# Patient Record
Sex: MALE | Race: WHITE | NOT HISPANIC OR LATINO | Employment: UNEMPLOYED | URBAN - METROPOLITAN AREA
[De-identification: names, ages, dates, MRNs, and addresses within clinical notes are randomized per-mention and may not be internally consistent; named-entity substitution may affect disease eponyms.]

---

## 2019-06-18 ENCOUNTER — HISTORICAL (OUTPATIENT)
Dept: LAB | Facility: HOSPITAL | Age: 59
End: 2019-06-18

## 2019-06-18 LAB
ABS NEUT (OLG): 4.21
BASOPHILS # BLD AUTO: 0.02 X10(3)/MCL
BASOPHILS NFR BLD AUTO: 0.3 %
BUN SERPL-MCNC: 14 MG/DL (ref 7–18)
CALCIUM SERPL-MCNC: 9.1 MG/DL (ref 8.5–10.1)
CHLORIDE SERPL-SCNC: 105 MMOL/L (ref 98–107)
CHOLEST SERPL-MCNC: 131 MG/DL (ref 50–200)
CHOLEST/HDLC SERPL: 3 {RATIO} (ref 0–5)
CO2 SERPL-SCNC: 31.2 MMOL/L (ref 21–32)
CREAT SERPL-MCNC: 1.15 MG/DL (ref 0.7–1.3)
CREAT/UREA NIT SERPL: 12
EOSINOPHIL # BLD AUTO: 0.09 X10(3)/MCL
EOSINOPHIL NFR BLD AUTO: 1.5 %
ERYTHROCYTE [DISTWIDTH] IN BLOOD BY AUTOMATED COUNT: 14 %
GLUCOSE SERPL-MCNC: 109 MG/DL (ref 74–106)
HCT VFR BLD AUTO: 47.2 % (ref 39–49)
HDLC SERPL-MCNC: 43 MG/DL (ref 35–60)
HGB BLD-MCNC: 15.2 GM/DL (ref 12.6–16.6)
IMM GRANULOCYTES # BLD AUTO: 0 10*3/UL (ref 0–0.1)
IMM GRANULOCYTES NFR BLD AUTO: 0 % (ref 0–1)
LDLC SERPL CALC-MCNC: 78 MG/DL (ref 50–140)
LYMPHOCYTES # BLD AUTO: 1.35 X10(3)/MCL
LYMPHOCYTES NFR BLD AUTO: 22 %
MCH RBC QN AUTO: 31.5 PG (ref 27–33)
MCHC RBC AUTO-ENTMCNC: 32.2 GM/DL (ref 32–35)
MCV RBC AUTO: 97.7 FL (ref 84–97)
MONOCYTES # BLD AUTO: 0.47 X10(3)/MCL
MONOCYTES NFR BLD AUTO: 7.7 %
NEUTROPHILS # BLD AUTO: 4.21 X10(3)/MCL
NEUTROPHILS NFR BLD AUTO: 68.5 %
PLATELET # BLD AUTO: 200 X10(3)/MCL (ref 151–368)
PMV BLD AUTO: 10 FL
POTASSIUM SERPL-SCNC: 4.4 MMOL/L (ref 3.5–5.1)
RBC # BLD AUTO: 4.83 X10(6)/MCL (ref 4.3–5.6)
SODIUM SERPL-SCNC: 141 MMOL/L (ref 136–145)
TRIGL SERPL-MCNC: 52 MG/DL (ref 30–150)
VLDLC SERPL CALC-MCNC: 10 MG/DL
WBC # SPEC AUTO: 6.14 X10(3)/MCL (ref 3.4–9.2)

## 2021-02-10 ENCOUNTER — HISTORICAL (OUTPATIENT)
Dept: ADMINISTRATIVE | Facility: HOSPITAL | Age: 61
End: 2021-02-10

## 2021-02-11 ENCOUNTER — HISTORICAL (OUTPATIENT)
Dept: ADMINISTRATIVE | Facility: HOSPITAL | Age: 61
End: 2021-02-11

## 2021-02-13 LAB — FINAL CULTURE: NO GROWTH

## 2021-04-19 ENCOUNTER — HISTORICAL (OUTPATIENT)
Dept: RADIOLOGY | Facility: HOSPITAL | Age: 61
End: 2021-04-19

## 2021-11-19 ENCOUNTER — HISTORICAL (OUTPATIENT)
Dept: ADMINISTRATIVE | Facility: HOSPITAL | Age: 61
End: 2021-11-19

## 2021-12-02 ENCOUNTER — HISTORICAL (OUTPATIENT)
Dept: ADMINISTRATIVE | Facility: HOSPITAL | Age: 61
End: 2021-12-02

## 2021-12-02 LAB — SARS-COV-2 AG RESP QL IA.RAPID: NEGATIVE

## 2021-12-03 ENCOUNTER — HISTORICAL (OUTPATIENT)
Dept: SURGERY | Facility: HOSPITAL | Age: 61
End: 2021-12-03

## 2022-04-10 ENCOUNTER — HISTORICAL (OUTPATIENT)
Dept: ADMINISTRATIVE | Facility: HOSPITAL | Age: 62
End: 2022-04-10
Payer: MEDICAID

## 2022-04-25 VITALS
BODY MASS INDEX: 35.18 KG/M2 | DIASTOLIC BLOOD PRESSURE: 86 MMHG | SYSTOLIC BLOOD PRESSURE: 162 MMHG | OXYGEN SATURATION: 100 % | HEIGHT: 71 IN | WEIGHT: 251.31 LBS

## 2022-05-03 NOTE — HISTORICAL [facility] CERNER
This is a historical note converted from [employer]. Formatting and pictures may have been removed.  Please reference [employer] for original formatting and attached multimedia. Indication for Surgery  60yM with nasal obstruction and nasal congestion secondary to inferior turbinate hypertrophy and angel bullosa. Very slight leftward deviation of the caudal septum however do not feel that this is the source of patients issue. He agrees and would like to hold off on septoplasty at this time. Risks benefits and alternatives of angel bullosa resection and turbinate reduction discussed. Consent obtained.  Preoperative Diagnosis  Nasal congestion  Turbinate hypertrophy  Right angel bullosa  Postoperative Diagnosis  Same as preop  Operation  Turbinate reduction, bilateral  Right angel bullosa resection  Surgeon(s)  MD Dean Lucero MD  Anesthesia  General  Estimated Blood Loss  15cc  Urine Output  None  Findings  Enlarged turbinates bilaterally. Good nasal airway post operatively.  Specimen(s)  None  Complications  None  Technique  After informed consent was obtained, the patient was brought to the operating room and placed supine on the operating table. General anesthesia was induced and patient intubated without difficulty. The eyes were protected with clear tape. Lidocaine 1% with epinephrine?1:100,000 was injected into the inferior turbinates bilaterally as well as the right middle turbinate/lateral nasal wall. Cotton pledgets soaked in afrin were then inserted into both nasal cavities. A timeout was performed. The patient was then prepped and draped in the standard sterile fashion.  ?   Cotton pledgets were removed from both nares. Nasal endoscopy with the zero degree scope was performed bilaterally with no concerning lesions. A sickle?knife was used to make an incision along the anterior face of the right middle turbinate. Using endoscopic?scissors, this incision was carried posteriorly along the  inferior aspect of the turbinate and superiorly through the posterior most aspect. The lateral portion of the middle turbinate was removed with a straight thru cut. The medial aspect of the middle turbinate was then gently lateralized with the freer.  ?   Attention was then turned to the turbinoplasty, starting on the right. A straight clamp was placed along the inferior portion of the turbinate. After several minutes, the hemostat was removed. A scissor was used to remove the inferior portion of the turbinate. Hemostasis was achieved with silver nitrate on the under surface of the turbinate. The same procedure was then completed on the left.  ?   The nasal cavity was irrigated and suctioned. The patient was then returned to the care of anesthesia for wake up.  ?

## 2022-07-07 RX ORDER — FLUTICASONE PROPIONATE 50 MCG
1 SPRAY, SUSPENSION (ML) NASAL DAILY
COMMUNITY
Start: 2021-08-20 | End: 2022-07-07 | Stop reason: SDUPTHER

## 2022-07-12 RX ORDER — FLUTICASONE PROPIONATE 50 UG/1
1 SPRAY, METERED NASAL DAILY
Qty: 9.9 ML | Refills: 4 | Status: SHIPPED | OUTPATIENT
Start: 2022-07-12

## 2022-07-13 ENCOUNTER — OFFICE VISIT (OUTPATIENT)
Dept: OTOLARYNGOLOGY | Facility: CLINIC | Age: 62
End: 2022-07-13
Payer: MEDICAID

## 2022-07-13 VITALS
RESPIRATION RATE: 16 BRPM | HEIGHT: 71 IN | WEIGHT: 248 LBS | BODY MASS INDEX: 34.72 KG/M2 | SYSTOLIC BLOOD PRESSURE: 173 MMHG | HEART RATE: 86 BPM | TEMPERATURE: 98 F | DIASTOLIC BLOOD PRESSURE: 84 MMHG

## 2022-07-13 DIAGNOSIS — J34.89 NASAL OBSTRUCTION: Primary | ICD-10-CM

## 2022-07-13 PROCEDURE — 99213 OFFICE O/P EST LOW 20 MIN: CPT | Mod: PBBFAC | Performed by: OTOLARYNGOLOGY

## 2022-07-13 NOTE — PROGRESS NOTES
[facility]  Otolaryngology Clinic Note    Hugh Jones Jr.  Encounter Date: 7/13/2022  YOB: 1960  Physician: Marjorie Ross    Chief Complaint: sinus pressure      HPI:   3/19/21: 60yoM referred for AR. He c/o chronic right nasal congestion and obstruction for a few years and is progressively getting worse and seems to be causing headaches. Hx of b/l clear rhinorrhea, PND, and sneezing which is now better controlled on singulair. Intermittent facial pain/pressure around nasal dorsum and periorbitally. Denies hyposmia. Used flonase daily for 2 years without benefit, recently discontinued. Reports running into a tree and messing up his nose a little bit as a small child. No prior surgery. Infrequent sinus infections which usually clear with OTC sinus meds. This is a Telehealth visit that was performed with the originating site at patient's home and the distant site, [facility]. I have reviewed the patient name and date of birth.  I have verified that this patient is in the state [facility]. Verbal consent to participate in interactive audio & video (unavailable) visit was obtained. This particular visit occurred during 2020 COVID19 outbreak. I discussed with the patient regarding the nature of our telehealth visits, that:   - Our sessions are not being recorded and that personal health information is protected   - I would evaluate the patient and recommend diagnostics and treatments based on my assessment  - The team will provide follow up care in person if/when the patient needs it.   Time spent on patient counseling, care coordination and treatment plan development: 13 min    5/6/21: Here today for follow up after CT scan. Reports constant congestion and nasal obstruction bilaterally but right worse than left. Feels that he is unable to get his air in. Endorses some clear rhinorrhea but only when eating spicy foods. No post nasal drip. Endorses facial pain and pressure over the  nasal dorsum and periorbitally especially on right. Denies change in smell or taste. Using Flonase daily for the last 2 years which he felt initially worked but is not helping anymore. Had stopped for a short while but restarted after last visit. Was started on azelastine at last office visit which has not helped.    8/4/21: here today to discuss surgery, cleared by cards    11/11/21: Here today to discuss surgery. He reports ongoing issues with congestion bilaterally, right worse than left. Continues to use flonase once daily but recently having some blood tinged mucous from right nare with use. Still interested in surgery.    12/9/21: Here for post op visit after turb reduction and angel bullosa resection 12/2. Has not used any NSI or NSS. Discomfort has resolved. However still doesn't feel like he can breath well through right nasal passage. Said he used breathe rite strips last night that helped. [1]    December 23, 2021:  61-year-old male presents today for follow-up of his bilateral inferior turbinectomy and right angel bullosa resection. Patient is doing nasal irrigations 2-3 times daily. He is still having difficulty with right-sided nasal obstruction. He is not having much problem with the left side of the nose. He is getting crust and old blood out periodically as well as periodic discolored drainage. He has no fever. No headaches.    1/18/21: Patient here today for third postop visit status post turbinectomy and angel resection. Patient has been doing Flonase as well as nasal saline twice a day. He feels much better than he did at his last visit. At his second visit there was some synechiae which was bluntly dissected as well as some mucopurulent secretions which were suctioned. He has not been getting much crusting or discharge from his nose since then. Reports he is doing well today with no nasal obstruction complaints and feels great.    7/13/22: Here for follow up. Hasn't followed up in a while;  reports he had an elective knee surgery in February and coded for several minutes with multiple days in the ICU after. Since then, he is very frightened to have general anesthesia again. Reports he did very well with his prior nasal surgery but for the past 2 weeks has recurrence of much the same symptoms. Started back on Flonase 3-4 days ago, but he thinks it might be elevating his blood pressure, though his new albuterol may be contributing to this. Mainly complains of right nasal congestion, mild obstruction.     ROS:   General: Negative except per HPI  Skin: Denies rash, ulcer, or lesion.  Eyes: Denies vision changes or diplopia.  Ears: Negative except per HPI  Nose: Negative except per HPI  Throat/mouth: Negative except per HPI  Cardiovascular: Negative except per HPI  Respiratory: Negative except per HPI  Neck: Negative except per HPI  Endocrine: Negative except per HPI  Neurologic: Negative except per HPI    Other 10-point review of systems negative except per HPI      Review of patient's allergies indicates:  No Known Allergies    History reviewed. No pertinent past medical history.    Past Surgical History:   Procedure Laterality Date    Left total knee replacement Left 02/08/2022    [facility]       Social History     Socioeconomic History    Marital status:    Tobacco Use    Smoking status: Never Smoker    Smokeless tobacco: Never Used       History reviewed. No pertinent family history.    Outpatient Encounter Medications as of 7/13/2022   Medication Sig Dispense Refill    FLONASE ALLERGY RELIEF 50 mcg/actuation nasal spray 1 spray (50 mcg total) by Each Nostril route once daily at 6am. 9.9 mL 4     No facility-administered encounter medications on file as of 7/13/2022.       Physical Exam:  Vitals:    07/13/22 1313   BP: (!) 173/84   BP Location: Right arm   Patient Position: Sitting   BP Method: Medium (Automatic)   Pulse: 86   Resp: 16   Temp: 97.5 °F (36.4 °C)   TempSrc: Oral  "  Weight: 112.5 kg (248 lb)   Height: 5' 11" (1.803 m)       Constitutional  General: Well-developed well-nourished male in no acute distress. Voice is normal. No hoarseness.  Head and face: Normocephalic. No facial lesions. No temporomandibular joint tenderness or click.  Nose: Slight anterior deviation of septum but overall straight. No ITH. No crusting.   Eyes: Extraocular muscles are intact bilaterally. No exophthalmos or enophthalmos.  Neurologic: Alert and oriented x3. Cranial nerves II through XII are grossly normal.    Pertinent Data:  ? LABS:  ? AUDIO:  ? CT Scan:    Imaging:   I personally reviewed the following images:    Summary of Outside Records:      Assessment/Plan:  Hugh Jones Jr. is a 61 y.o. male with nasal obstruction status post bilateral inferior turbinectomy and right middle turbinate angel bullosa resection on December 3, 2021. Reports recurrent symptoms today x 2 weeks but has not been on flonase. Eager to avoid surgery due to anesthetic complications during knee surgery in Feb. Also has some improvement with Modified digna    Plan:  Restart fluticasone nasal spray 2 puffs each nostril BID. Monitor blood pressures.    Could consider septoplasty in future but unsure this would provide much relief of symptoms.     RTC 4-6 weeks.     Marjorie Ross MD  [facility] Department of Otolaryngology  HO-IV            "

## 2022-07-21 NOTE — PROGRESS NOTES
I have reviewed the notes, assessments, and/or procedures performed this visit, and I concur with the documentation.    Dean Randhawa M.D.

## 2022-08-30 ENCOUNTER — OFFICE VISIT (OUTPATIENT)
Dept: OTOLARYNGOLOGY | Facility: CLINIC | Age: 62
End: 2022-08-30
Payer: MEDICAID

## 2022-08-30 VITALS
DIASTOLIC BLOOD PRESSURE: 92 MMHG | WEIGHT: 246.81 LBS | BODY MASS INDEX: 34.42 KG/M2 | HEART RATE: 102 BPM | SYSTOLIC BLOOD PRESSURE: 175 MMHG

## 2022-08-30 DIAGNOSIS — J32.9 CHRONIC SINUSITIS, UNSPECIFIED LOCATION: ICD-10-CM

## 2022-08-30 DIAGNOSIS — K21.9 LARYNGOPHARYNGEAL REFLUX (LPR): Primary | ICD-10-CM

## 2022-08-30 DIAGNOSIS — J30.89 ALLERGIC RHINITIS DUE TO OTHER ALLERGIC TRIGGER, UNSPECIFIED SEASONALITY: ICD-10-CM

## 2022-08-30 DIAGNOSIS — J34.89 NASAL OBSTRUCTION: ICD-10-CM

## 2022-08-30 PROCEDURE — 99213 OFFICE O/P EST LOW 20 MIN: CPT | Mod: PBBFAC | Performed by: OTOLARYNGOLOGY

## 2022-08-30 RX ORDER — CETIRIZINE HYDROCHLORIDE 10 MG/1
10 TABLET ORAL DAILY
Qty: 30 TABLET | Refills: 3 | Status: SHIPPED | OUTPATIENT
Start: 2022-08-30 | End: 2023-08-30

## 2022-08-30 RX ORDER — AZELASTINE 1 MG/ML
1 SPRAY, METERED NASAL 2 TIMES DAILY
Qty: 30 ML | Refills: 1 | Status: SHIPPED | OUTPATIENT
Start: 2022-08-30 | End: 2023-08-30

## 2022-08-30 RX ORDER — PANTOPRAZOLE SODIUM 40 MG/1
40 TABLET, DELAYED RELEASE ORAL DAILY
Qty: 60 TABLET | Refills: 2 | Status: SHIPPED | OUTPATIENT
Start: 2022-08-30 | End: 2023-08-30

## 2022-08-30 NOTE — PROGRESS NOTES
[facility]  Otolaryngology Clinic Note    Hugh Jones Jr.  Encounter Date: 8/30/2022  YOB: 1960    Chief Complaint: sinus pressure    HPI:   3/19/21: 60yoM referred for AR. He c/o chronic right nasal congestion and obstruction for a few years and is progressively getting worse and seems to be causing headaches. Hx of b/l clear rhinorrhea, PND, and sneezing which is now better controlled on singulair. Intermittent facial pain/pressure around nasal dorsum and periorbitally. Denies hyposmia. Used flonase daily for 2 years without benefit, recently discontinued. Reports running into a tree and messing up his nose a little bit as a small child. No prior surgery. Infrequent sinus infections which usually clear with OTC sinus meds. This is a Telehealth visit that was performed with the originating site at patient's home and the distant site, [facility]. I have reviewed the patient name and date of birth.  I have verified that this patient is in the state [facility]. Verbal consent to participate in interactive audio & video (unavailable) visit was obtained. This particular visit occurred during 2020 COVID19 outbreak. I discussed with the patient regarding the nature of our telehealth visits, that:   - Our sessions are not being recorded and that personal health information is protected   - I would evaluate the patient and recommend diagnostics and treatments based on my assessment  - The team will provide follow up care in person if/when the patient needs it.   Time spent on patient counseling, care coordination and treatment plan development: 13 min    5/6/21: Here today for follow up after CT scan. Reports constant congestion and nasal obstruction bilaterally but right worse than left. Feels that he is unable to get his air in. Endorses some clear rhinorrhea but only when eating spicy foods. No post nasal drip. Endorses facial pain and pressure over the nasal dorsum and  periorbitally especially on right. Denies change in smell or taste. Using Flonase daily for the last 2 years which he felt initially worked but is not helping anymore. Had stopped for a short while but restarted after last visit. Was started on azelastine at last office visit which has not helped.    8/4/21: here today to discuss surgery, cleared by cards    11/11/21: Here today to discuss surgery. He reports ongoing issues with congestion bilaterally, right worse than left. Continues to use flonase once daily but recently having some blood tinged mucous from right nare with use. Still interested in surgery.    12/9/21: Here for post op visit after turb reduction and angel bullosa resection 12/2. Has not used any NSI or NSS. Discomfort has resolved. However still doesn't feel like he can breath well through right nasal passage. Said he used breathe rite strips last night that helped. [1]    December 23, 2021:  61-year-old male presents today for follow-up of his bilateral inferior turbinectomy and right angel bullosa resection. Patient is doing nasal irrigations 2-3 times daily. He is still having difficulty with right-sided nasal obstruction. He is not having much problem with the left side of the nose. He is getting crust and old blood out periodically as well as periodic discolored drainage. He has no fever. No headaches.    1/18/21: Patient here today for third postop visit status post turbinectomy and angel resection. Patient has been doing Flonase as well as nasal saline twice a day. He feels much better than he did at his last visit. At his second visit there was some synechiae which was bluntly dissected as well as some mucopurulent secretions which were suctioned. He has not been getting much crusting or discharge from his nose since then. Reports he is doing well today with no nasal obstruction complaints and feels great.    7/13/22: Here for follow up. Hasn't followed up in a while; reports he had an  elective knee surgery in February and coded for several minutes with multiple days in the ICU after. Since then, he is very frightened to have general anesthesia again. Reports he did very well with his prior nasal surgery but for the past 2 weeks has recurrence of much the same symptoms. Started back on Flonase 3-4 days ago, but he thinks it might be elevating his blood pressure, though his new albuterol may be contributing to this. Mainly complains of right nasal congestion, mild obstruction.     8/30/22: Patient presents today for follow up. He continues to report difficulty breathing on the right side and notes that he has to switch breathing between his nose and mouth. He states he has been using Flonase twice daily without resolution of symptoms. He complains of itchy eyes, post-nasal drip and sore throat for the past 3 weeks associated with cough and thick, yellow mucus. He states that he has tried Tylenol and OTC cold and flu medications without improvement. He complains of left otalgia and fullness. He denies recent illness, fever, chills, hemoptysis, nausea, or vomiting. He complains of globus and denies heartburn.     ROS:   General: Negative except per HPI  Skin: Denies rash, ulcer, or lesion.  Eyes: Denies vision changes or diplopia.  Ears: Negative except per HPI  Nose: Negative except per HPI  Throat/mouth: Negative except per HPI  Cardiovascular: Negative except per HPI  Respiratory: Negative except per HPI  Neck: Negative except per HPI  Endocrine: Negative except per HPI  Neurologic: Negative except per HPI    Other 10-point review of systems negative except per HPI    Review of patient's allergies indicates:  No Known Allergies    History reviewed. No pertinent past medical history.    Past Surgical History:   Procedure Laterality Date    Left total knee replacement Left 02/08/2022    [facility]    NASAL SINUS SURGERY Bilateral        Social History     Socioeconomic History    Marital  "status:    Tobacco Use    Smoking status: Never    Smokeless tobacco: Never   Substance and Sexual Activity    Drug use: Not Currently    Sexual activity: Not Currently     History reviewed. No pertinent family history.    Outpatient Encounter Medications as of 8/30/2022   Medication Sig Dispense Refill    FLONASE ALLERGY RELIEF 50 mcg/actuation nasal spray 1 spray (50 mcg total) by Each Nostril route once daily at 6am. 9.9 mL 4     No facility-administered encounter medications on file as of 8/30/2022.     Physical Exam:  Vitals:    08/30/22 1312   BP: (!) 175/92   BP Location: Left arm   Pulse: 102   Weight: 111.9 kg (246 lb 12.8 oz)     Constitutional  General: Well-developed well-nourished male in no acute distress. Voice is normal. No hoarseness.  Head and face: Normocephalic. No facial lesions. No temporomandibular joint tenderness or click.  Nose: Slight anterior deviation of septum but overall straight. No ITH. No crusting. "Minimal" improvement with modified digna.  Eyes: Extraocular muscles are intact bilaterally. No exophthalmos or enophthalmos.  Neurologic: Alert and oriented x3. Cranial nerves II through XII are grossly normal.    Assessment/Plan:  Hugh Jones Jr. is a 62 y.o. male with nasal obstruction status post bilateral inferior turbinectomy and right middle turbinate angel bullosa resection on December 3, 2021. Reports he had around 3 months of relief but that he feels like he is back where he started preoperative. This has not improved with flonase. Now reporting post-nasal drop and sore throat associated with cough and thick mucus, as well as throat clearing and globus. Patient would like to continue conservative management and avoid surgery unless necessary.     - Continue to use Flonase once daily and add Astelin to improve respiration and post-nasal drip.  - Will refer patient for allergy testing as he said he has been sneezing a lot and itchy watery eyes. Patient prescribed " "cetrizine for allergy symptoms.  - Protonix prescribed for globus for possible LPR component of complaints.  - CT sinus scan prior to appointment given recurrent sinus complaints. Does not like NSI as "it doesn't help"  - RTC in 6 weeks.    Radha Orellana, MS3  [facility] Department of Otolaryngology    Agree with above with addition:   Will attempt to treat patient's complaints from above with sinus regimen and astelin/allergy meds/RAST and below with LPR control with protonix.   Discussed that there may be a role for OSRP in the future but do not feel convinced that his complaints are truly anatomical as he (1) had complete resolution for 3 months after last surgery, (2) minimal improvement with breathe right strips/modified digna. His septum is pretty straight on last CT though nasal vault is perhaps narrow.     Marjorie Ross MD  [facility] Otolaryngology HO-IV              "

## 2022-09-07 ENCOUNTER — HOSPITAL ENCOUNTER (OUTPATIENT)
Dept: RADIOLOGY | Facility: HOSPITAL | Age: 62
Discharge: HOME | End: 2022-09-07
Attending: STUDENT IN AN ORGANIZED HEALTH CARE EDUCATION/TRAINING PROGRAM
Payer: MEDICAID

## 2022-09-07 DIAGNOSIS — J32.9 CHRONIC SINUSITIS, UNSPECIFIED LOCATION: ICD-10-CM

## 2022-09-07 PROCEDURE — 70486 CT MAXILLOFACIAL W/O DYE: CPT | Mod: TC

## 2022-10-11 ENCOUNTER — OFFICE VISIT (OUTPATIENT)
Dept: OTOLARYNGOLOGY | Facility: CLINIC | Age: 62
End: 2022-10-11
Payer: MEDICAID

## 2022-10-11 VITALS
SYSTOLIC BLOOD PRESSURE: 131 MMHG | TEMPERATURE: 98 F | WEIGHT: 243 LBS | BODY MASS INDEX: 33.89 KG/M2 | HEART RATE: 81 BPM | DIASTOLIC BLOOD PRESSURE: 79 MMHG

## 2022-10-11 DIAGNOSIS — R09.81 CHRONIC NASAL CONGESTION: ICD-10-CM

## 2022-10-11 PROCEDURE — 99213 OFFICE O/P EST LOW 20 MIN: CPT | Mod: PBBFAC | Performed by: STUDENT IN AN ORGANIZED HEALTH CARE EDUCATION/TRAINING PROGRAM

## 2022-10-11 RX ORDER — AMLODIPINE BESYLATE 5 MG/1
5 TABLET ORAL DAILY
COMMUNITY
Start: 2022-10-04

## 2022-10-11 RX ORDER — OLMESARTAN MEDOXOMIL 20 MG/1
TABLET ORAL
COMMUNITY
Start: 2022-06-03

## 2022-10-11 NOTE — PROGRESS NOTES
[facility]  Otolaryngology Clinic Note    Hugh Jones Jr.  Encounter Date: 10/11/2022  YOB: 1960    Chief Complaint: sinus pressure    HPI:   3/19/21: 60yoM referred for AR. He c/o chronic right nasal congestion and obstruction for a few years and is progressively getting worse and seems to be causing headaches. Hx of b/l clear rhinorrhea, PND, and sneezing which is now better controlled on singulair. Intermittent facial pain/pressure around nasal dorsum and periorbitally. Denies hyposmia. Used flonase daily for 2 years without benefit, recently discontinued. Reports running into a tree and messing up his nose a little bit as a small child. No prior surgery. Infrequent sinus infections which usually clear with OTC sinus meds. This is a Telehealth visit that was performed with the originating site at patient's home and the distant site, [facility]. I have reviewed the patient name and date of birth.  I have verified that this patient is in the state [facility]. Verbal consent to participate in interactive audio & video (unavailable) visit was obtained. This particular visit occurred during 2020 COVID19 outbreak. I discussed with the patient regarding the nature of our telehealth visits, that:   - Our sessions are not being recorded and that personal health information is protected   - I would evaluate the patient and recommend diagnostics and treatments based on my assessment  - The team will provide follow up care in person if/when the patient needs it.   Time spent on patient counseling, care coordination and treatment plan development: 13 min    5/6/21: Here today for follow up after CT scan. Reports constant congestion and nasal obstruction bilaterally but right worse than left. Feels that he is unable to get his air in. Endorses some clear rhinorrhea but only when eating spicy foods. No post nasal drip. Endorses facial pain and pressure over the nasal dorsum and  periorbitally especially on right. Denies change in smell or taste. Using Flonase daily for the last 2 years which he felt initially worked but is not helping anymore. Had stopped for a short while but restarted after last visit. Was started on azelastine at last office visit which has not helped.    8/4/21: here today to discuss surgery, cleared by cards    11/11/21: Here today to discuss surgery. He reports ongoing issues with congestion bilaterally, right worse than left. Continues to use flonase once daily but recently having some blood tinged mucous from right nare with use. Still interested in surgery.    12/9/21: Here for post op visit after turb reduction and angel bullosa resection 12/2. Has not used any NSI or NSS. Discomfort has resolved. However still doesn't feel like he can breath well through right nasal passage. Said he used breathe rite strips last night that helped. [1]    December 23, 2021:  61-year-old male presents today for follow-up of his bilateral inferior turbinectomy and right angel bullosa resection. Patient is doing nasal irrigations 2-3 times daily. He is still having difficulty with right-sided nasal obstruction. He is not having much problem with the left side of the nose. He is getting crust and old blood out periodically as well as periodic discolored drainage. He has no fever. No headaches.    1/18/21: Patient here today for third postop visit status post turbinectomy and angel resection. Patient has been doing Flonase as well as nasal saline twice a day. He feels much better than he did at his last visit. At his second visit there was some synechiae which was bluntly dissected as well as some mucopurulent secretions which were suctioned. He has not been getting much crusting or discharge from his nose since then. Reports he is doing well today with no nasal obstruction complaints and feels great.    7/13/22: Here for follow up. Hasn't followed up in a while; reports he had an  elective knee surgery in February and coded for several minutes with multiple days in the ICU after. Since then, he is very frightened to have general anesthesia again. Reports he did very well with his prior nasal surgery but for the past 2 weeks has recurrence of much the same symptoms. Started back on Flonase 3-4 days ago, but he thinks it might be elevating his blood pressure, though his new albuterol may be contributing to this. Mainly complains of right nasal congestion, mild obstruction.     8/30/22: Patient presents today for follow up. He continues to report difficulty breathing on the right side and notes that he has to switch breathing between his nose and mouth. He states he has been using Flonase twice daily without resolution of symptoms. He complains of itchy eyes, post-nasal drip and sore throat for the past 3 weeks associated with cough and thick, yellow mucus. He states that he has tried Tylenol and OTC cold and flu medications without improvement. He complains of left otalgia and fullness. He denies recent illness, fever, chills, hemoptysis, nausea, or vomiting. He complains of globus and denies heartburn.     10/11/22: Patient here today for follow up. He did not know he was supposed to obtain blood work for RAST. Compliant with astelin and zyrtec. Some improvement in his nasal congestion after switching from flonase to astelin. He is having some drowsiness with his zyrtec, not wanting to change meds at this time. No otalgia, reflux ssx, SOB, globus. Endorses chronic PND and rhinorrhea.    ROS:   General: Negative except per HPI  Skin: Denies rash, ulcer, or lesion.  Eyes: Denies vision changes or diplopia.  Ears: Negative except per HPI  Nose: Negative except per HPI  Throat/mouth: Negative except per HPI  Cardiovascular: Negative except per HPI  Respiratory: Negative except per HPI  Neck: Negative except per HPI  Endocrine: Negative except per HPI  Neurologic: Negative except per HPI    Other  10-point review of systems negative except per HPI    Review of patient's allergies indicates:  No Known Allergies    History reviewed. No pertinent past medical history.    Past Surgical History:   Procedure Laterality Date    Left total knee replacement Left 02/08/2022    [facility]    NASAL SINUS SURGERY Bilateral        Social History     Socioeconomic History    Marital status:    Tobacco Use    Smoking status: Never    Smokeless tobacco: Never   Substance and Sexual Activity    Drug use: Not Currently    Sexual activity: Not Currently     History reviewed. No pertinent family history.    Outpatient Encounter Medications as of 10/11/2022   Medication Sig Dispense Refill    amLODIPine (NORVASC) 5 MG tablet Take 5 mg by mouth once daily.      azelastine (ASTELIN) 137 mcg (0.1 %) nasal spray 1 spray (137 mcg total) by Nasal route 2 (two) times daily. 30 mL 1    cetirizine (ZYRTEC) 10 MG tablet Take 1 tablet (10 mg total) by mouth once daily. 30 tablet 3    FLONASE ALLERGY RELIEF 50 mcg/actuation nasal spray 1 spray (50 mcg total) by Each Nostril route once daily at 6am. 9.9 mL 4    olmesartan (BENICAR) 20 MG tablet TAKE 1/2 (ONE-HALF) TABLET BY MOUTH ONCE DAILY (STOP BENICAR 40MG)      pantoprazole (PROTONIX) 40 MG tablet Take 1 tablet (40 mg total) by mouth once daily. 60 tablet 2     No facility-administered encounter medications on file as of 10/11/2022.     Physical Exam:  Vitals:    10/11/22 1249   BP: 131/79   Pulse: 81   Temp: 97.7 °F (36.5 °C)   Weight: 110.2 kg (243 lb)     Constitutional  General: Well-developed well-nourished male in no acute distress. Voice is normal. No hoarseness.  Head and face: Normocephalic. No facial lesions. No temporomandibular joint tenderness or click.  Ears: external  wnl, canal snl, TM intact bilateraly without SILVIA  Nose: Slight anterior deviation of septum but overall straight. No ITH. No crusting. Moderate improvement with modified digna on right.  "Synechiae in left nasal cavity to septum.   Eyes: Extraocular muscles are intact bilaterally. No exophthalmos or enophthalmos.  Neurologic: Alert and oriented x3. Cranial nerves II through XII are grossly normal.      CT Sinuses 9/7/22: no sinus disease noted. Patent airway. Reduced inferior turbinates. Synechiae in left nasal cavity.     Assessment/Plan:  Hugh Jones Jr. is a 62 y.o. male with nasal obstruction status post bilateral inferior turbinectomy and right middle turbinate angel bullosa resection on December 3, 2021. Reports he had around 3 months of relief but that he feels like he is back where he started preoperative. This has not improved with flonase. He has had some improvement with Astelin. He is not having any issues with reflux and has no changes since taking ppi. Patient with moderate improvement with modified digna on the right but the remainder of his nasal structures are in good position without obstruction on CT scan. Concern for possible empty nose syndrome if there is no allergy noted on testing.     - Patient to have RAST drawn  - continue Zyrtec and Astelin. Patient adamant about not taking flonase as it "makes his blood pressure go up"  - patient does have some drowsiness with his zyrtec but is not wanting to make a change in this medication yet. Will discuss at next apt.   - RTC 1 month for follow up      PRACHI Quiñones MD  [facility] Otolaryngology PGY III                  "

## 2022-10-12 ENCOUNTER — LAB VISIT (OUTPATIENT)
Dept: LAB | Facility: HOSPITAL | Age: 62
End: 2022-10-12
Attending: STUDENT IN AN ORGANIZED HEALTH CARE EDUCATION/TRAINING PROGRAM
Payer: MEDICAID

## 2022-10-12 DIAGNOSIS — J32.9 CHRONIC SINUSITIS, UNSPECIFIED LOCATION: ICD-10-CM

## 2022-10-12 DIAGNOSIS — K21.9 LARYNGOPHARYNGEAL REFLUX (LPR): ICD-10-CM

## 2022-10-12 DIAGNOSIS — J30.89 ALLERGIC RHINITIS DUE TO OTHER ALLERGIC TRIGGER, UNSPECIFIED SEASONALITY: ICD-10-CM

## 2022-10-12 DIAGNOSIS — J34.89 NASAL OBSTRUCTION: ICD-10-CM

## 2022-10-12 PROCEDURE — 36415 COLL VENOUS BLD VENIPUNCTURE: CPT

## 2022-10-12 PROCEDURE — 86003 ALLG SPEC IGE CRUDE XTRC EA: CPT

## 2022-10-14 LAB
ALLERGEN ALTERNARIA ALTERNATA IGE (OLG): <0.1
ALLERGEN ASPERGILLUS FUMIGATUS IGE (OLG): <0.1
ALLERGEN BERMUDA GRASS IGE (OLG): <0.1
ALLERGEN BOXELDER MAPLE TREE IGE (OLG): <0.1
ALLERGEN CAT DANDER IGE (OLG): <0.1
ALLERGEN CLADOSPORIUM HERBARUM IGE (OLG): <0.1
ALLERGEN COCKROACH GERMAN IGE (OLG): <0.1
ALLERGEN COMMON RAGWEED IGE (OLG): <0.1
ALLERGEN DOG DANDER IGE (OLG): <0.1
ALLERGEN DUST MITE (D. PTERONYSSINUS) IGE (OLG): <0.1
ALLERGEN DUST MITE (D.FARINAE) IGE (OLG): <0.1
ALLERGEN ELM TREE IGE (OLG): <0.1
ALLERGEN HORSE DANDER IGE (OLG): <0.1
ALLERGEN MOUNTAIN JUNIPER TREE IGE (OLG): <0.1
ALLERGEN MOUSE URINE PROTEINS IGE (OLG): <0.1
ALLERGEN MULBERRY TREE IGE (OLG): <0.1
ALLERGEN OAK TREE IGE (OLG): <0.1
ALLERGEN PECAN HICKORY TREE IGE (OLG): <0.1
ALLERGEN PENICILLIUM CHRYSOGENUM IGE (OLG): <0.1
ALLERGEN PIGWEED IGE (OLG): <0.1
ALLERGEN ROUGH MARSH ELDER IGE (OLG): <0.1
ALLERGEN SILVER BIRCH TREE IGE (OLG): <0.1
ALLERGEN TIMOTHY GRASS IGE (OLG): <0.1
ALLERGEN WALNUT TREE IGE (OLG): <0.1
PHADIOTOP IGE QN: 14.5 KU/L

## 2023-08-03 ENCOUNTER — HOSPITAL ENCOUNTER (EMERGENCY)
Facility: HOSPITAL | Age: 63
Discharge: HOME | End: 2023-08-03
Attending: EMERGENCY MEDICINE
Payer: MEDICAID

## 2023-08-03 VITALS
RESPIRATION RATE: 18 BRPM | HEIGHT: 72 IN | SYSTOLIC BLOOD PRESSURE: 152 MMHG | TEMPERATURE: 99 F | WEIGHT: 138 LBS | OXYGEN SATURATION: 100 % | DIASTOLIC BLOOD PRESSURE: 100 MMHG | BODY MASS INDEX: 18.69 KG/M2 | HEART RATE: 110 BPM

## 2023-08-03 DIAGNOSIS — M25.562 LEFT KNEE PAIN: ICD-10-CM

## 2023-08-03 DIAGNOSIS — S82.002A CLOSED NONDISPLACED FRACTURE OF LEFT PATELLA, UNSPECIFIED FRACTURE MORPHOLOGY, INITIAL ENCOUNTER: Primary | ICD-10-CM

## 2023-08-03 PROCEDURE — 99283 EMERGENCY DEPT VISIT LOW MDM: CPT | Mod: 25

## 2023-08-03 PROCEDURE — 29505 APPLICATION LONG LEG SPLINT: CPT | Mod: LT

## 2023-08-03 RX ORDER — ATORVASTATIN CALCIUM 20 MG/1
20 TABLET, FILM COATED ORAL
COMMUNITY

## 2023-08-03 RX ORDER — NEBIVOLOL 10 MG/1
10 TABLET ORAL
COMMUNITY
Start: 2022-01-18

## 2023-08-03 RX ORDER — FUROSEMIDE 20 MG/1
20 TABLET ORAL
COMMUNITY
Start: 2021-12-21

## 2023-08-03 RX ORDER — CLONIDINE HYDROCHLORIDE 0.2 MG/1
0.2 TABLET ORAL
COMMUNITY
Start: 2021-12-21

## 2023-08-03 RX ORDER — DULOXETINE 40 MG/1
1 CAPSULE, DELAYED RELEASE ORAL
COMMUNITY
Start: 2023-07-11

## 2023-08-03 RX ORDER — BUSPIRONE HYDROCHLORIDE 15 MG/1
15 TABLET ORAL 2 TIMES DAILY
COMMUNITY
Start: 2023-07-02

## 2023-08-03 NOTE — ED TRIAGE NOTES
t instructed to go to ED from Dr Porter for further evaluation due to recent Xray which indicated a fracture with hx of total knee replacement

## 2023-08-03 NOTE — ED PROVIDER NOTES
Encounter Date: 8/3/2023       History     Chief Complaint   Patient presents with    Knee Pain     Pt instructed to go to ED from Dr Porter for further evaluation due to recent Xray which indicated a fracture with hx of total knee replacement     The patient is a 62 y.o. male who presents to the Emergency Department with a chief complaint of left knee pain. Patient states that he recently had left knee replacement. States that he has been having pain in the knee for about 1 month. He denies fall or injury to the knee. Symptoms began 1 month ago and have been constant since onset. His pain is currently rated as a 5/10 in severity and described as aching with no radiation. Associated symptoms include nothing. Symptoms are aggravated with movement/ambulation and there are no alleviating factors. The patient denies numbness or tingling to extremity. He reports taking nothing prior to arrival with no relief of symptoms. No other reported symptoms at this time     The history is provided by the patient. No  was used.   Knee Pain  This is a new problem. The current episode started more than 1 week ago. The problem occurs daily. The problem has not changed since onset.Pertinent negatives include no headaches. The symptoms are aggravated by walking and bending. Nothing relieves the symptoms. He has tried nothing for the symptoms. The treatment provided no relief.     Review of patient's allergies indicates:  No Known Allergies  History reviewed. No pertinent past medical history.  Past Surgical History:   Procedure Laterality Date    Left total knee replacement Left 02/08/2022    [facility]    NASAL SINUS SURGERY Bilateral      History reviewed. No pertinent family history.  Social History     Tobacco Use    Smoking status: Never    Smokeless tobacco: Never   Substance Use Topics    Drug use: Not Currently     Review of Systems   Constitutional:  Negative for fever.   Gastrointestinal:   Negative for nausea.   Genitourinary:  Negative for dysuria.   Musculoskeletal:  Positive for arthralgias. Negative for back pain.   Skin:  Negative for rash.   Neurological:  Negative for weakness and headaches.   Hematological:  Does not bruise/bleed easily.   All other systems reviewed and are negative.      Physical Exam     Initial Vitals [08/03/23 1633]   BP Pulse Resp Temp SpO2   (!) 152/100 110 18 98.6 °F (37 °C) 100 %      MAP       --         Physical Exam    Nursing note and vitals reviewed.  Constitutional: Vital signs are normal. He appears well-developed and well-nourished.   HENT:   Head: Normocephalic.   Right Ear: Hearing and tympanic membrane normal.   Left Ear: Hearing and tympanic membrane normal.   Mouth/Throat: Uvula is midline, oropharynx is clear and moist and mucous membranes are normal.   Cardiovascular:  Normal rate, regular rhythm, normal heart sounds and normal pulses.           Pulmonary/Chest: Effort normal and breath sounds normal.   Musculoskeletal:      Cervical back: No spinous process tenderness or muscular tenderness.      Right knee: Normal.      Left knee: Bony tenderness present. No swelling. Normal pulse.      Comments: All other adjacent joints normal      Neurological: He is alert. GCS eye subscore is 4. GCS verbal subscore is 5. GCS motor subscore is 6.   Skin: Skin is warm, dry and intact. Capillary refill takes less than 2 seconds.         ED Course   Procedures  Labs Reviewed - No data to display       Imaging Results              X-Ray Knee Complete 4 or More Views Left (Final result)  Result time 08/03/23 17:27:13      Final result by Romulo Brown MD (08/03/23 17:27:13)                   Impression:      Fracture inferior pole of the patella.      Electronically signed by: Romulo Brown  Date:    08/03/2023  Time:    17:27               Narrative:    EXAMINATION:  XR KNEE COMP 4 OR MORE VIEWS LEFT    CLINICAL HISTORY:  Pain in left  knee    TECHNIQUE:  Three-view    COMPARISON:  March 24, 2021    FINDINGS:  There is left knee arthroplasty in satisfactory position and alignment.  There is fracture deformity involving the inferior aspect of the patella seen on lateral projection.                                       Medications - No data to display  Medical Decision Making:   Initial Assessment:   The patient is a 62 y.o. male who presents to the Emergency Department with a chief complaint of left knee pain. Patient states that he recently had left knee replacement. States that he has been having pain in the knee for about 1 month. He denies fall or injury to the knee. Symptoms began 1 month ago and have been constant since onset. His pain is currently rated as a 5/10 in severity and described as aching with no radiation. Associated symptoms include nothing. Symptoms are aggravated with movement/ambulation and there are no alleviating factors. The patient denies numbness or tingling to extremity. He reports taking nothing prior to arrival with no relief of symptoms. No other reported symptoms at this time   Differential Diagnosis:   Differential diagnosis includes are not limited to knee fracture, postoperative pain, joint effusion  Clinical Tests:   Radiological Study: Ordered and Reviewed  ED Management:  MDM  History obtained by patient.   Co-morbidities and/or factors adding to the complexity or risk for the patient?: none  Differential diagnoses: Differential diagnosis includes are not limited to knee fracture, postoperative pain, joint effusion  Decision to obtain previous or outside records?: no  Chart Review (nursing home, outside records, CareEverywhere): none  Review of RX medications/new RX prescribed by me?: none  My EKG Interpretation: see above  Labs/imaging/other tests obtained/considered (risk/benefits of testing discussed): xr left knee  Labs/tests intepretation: XR shows left patella fracture  My independent imaging  interpretation: none  Treatment/interventions, IV fluids, IV medications: knee immobilizer   Complex management (IV controlled substances, went to OR, DNR, meds requiring monitoring, transfer, etc)?: none  Workup/treatment affected by social determinants of health?: none   Point of care US done/interpretation: none  Consults/radiologist/EMS/social work/family discussion/alternate history: none  Advanced care planning/end of life discussion: none  Shared decision making: Shared decision making with patient.  ETOH/smoking/drug cessation discussion: none  Dispo: discharge home.               ED Course as of 08/03/23 1822   Thu Aug 03, 2023   1800 There is left knee arthroplasty in satisfactory position and alignment.  There is fracture deformity involving the inferior aspect of the patella seen on lateral projection. [LM]   1818 Patient placed in knee immobilizer. Will send ortho referral [LM]      ED Course User Index  [LM] Ismael York NP                 Clinical Impression:   Final diagnoses:  [M25.562] Left knee pain  [S82.002A] Closed nondisplaced fracture of left patella, unspecified fracture morphology, initial encounter (Primary)        ED Disposition Condition    Discharge Stable          ED Prescriptions    None       Follow-up Information       Follow up With Specialties Details Why Contact Info    Adelfo Hicks,  Orthopedic Surgery Schedule an appointment as soon as possible for a visit   [address]  [address]  [facility] [id number] [phone]               Ismael York NP  08/03/23 1822

## 2023-08-15 ENCOUNTER — HOSPITAL ENCOUNTER (OUTPATIENT)
Dept: RADIOLOGY | Facility: CLINIC | Age: 63
Discharge: HOME | End: 2023-08-15
Attending: ORTHOPAEDIC SURGERY
Payer: MEDICAID

## 2023-08-15 ENCOUNTER — OFFICE VISIT (OUTPATIENT)
Dept: ORTHOPEDICS | Facility: CLINIC | Age: 63
End: 2023-08-15
Payer: MEDICAID

## 2023-08-15 DIAGNOSIS — S82.002A CLOSED NONDISPLACED FRACTURE OF LEFT PATELLA, UNSPECIFIED FRACTURE MORPHOLOGY, INITIAL ENCOUNTER: ICD-10-CM

## 2023-08-15 PROCEDURE — 73560 X-RAY EXAM OF KNEE 1 OR 2: CPT | Mod: LT,,, | Performed by: ORTHOPAEDIC SURGERY

## 2023-08-15 PROCEDURE — 99203 OFFICE O/P NEW LOW 30 MIN: CPT | Mod: ,,, | Performed by: ORTHOPAEDIC SURGERY

## 2023-08-15 PROCEDURE — 1159F PR MEDICATION LIST DOCUMENTED IN MEDICAL RECORD: ICD-10-PCS | Mod: CPTII,,, | Performed by: ORTHOPAEDIC SURGERY

## 2023-08-15 PROCEDURE — 4010F ACE/ARB THERAPY RXD/TAKEN: CPT | Mod: CPTII,,, | Performed by: ORTHOPAEDIC SURGERY

## 2023-08-15 PROCEDURE — 73560 XR KNEE 1 OR 2 VIEW LEFT: ICD-10-PCS | Mod: LT,,, | Performed by: ORTHOPAEDIC SURGERY

## 2023-08-15 PROCEDURE — 3008F BODY MASS INDEX DOCD: CPT | Mod: CPTII,,, | Performed by: ORTHOPAEDIC SURGERY

## 2023-08-15 PROCEDURE — 1159F MED LIST DOCD IN RCRD: CPT | Mod: CPTII,,, | Performed by: ORTHOPAEDIC SURGERY

## 2023-08-15 PROCEDURE — 3008F PR BODY MASS INDEX (BMI) DOCUMENTED: ICD-10-PCS | Mod: CPTII,,, | Performed by: ORTHOPAEDIC SURGERY

## 2023-08-15 PROCEDURE — 4010F PR ACE/ARB THEARPY RXD/TAKEN: ICD-10-PCS | Mod: CPTII,,, | Performed by: ORTHOPAEDIC SURGERY

## 2023-08-15 PROCEDURE — 99203 PR OFFICE/OUTPT VISIT, NEW, LEVL III, 30-44 MIN: ICD-10-PCS | Mod: ,,, | Performed by: ORTHOPAEDIC SURGERY

## 2023-08-15 NOTE — PROGRESS NOTES
Subjective:       Patient ID: Hugh Jones Jr. is a 62 y.o. male.  Chief Complaint   Patient presents with    Left Knee - Injury     appx 6 week hx of left knee pain, s/p left tka 1 year ago.  No new injury reported.  Xray in ER. Brace, here today without brace full weightbearing.          HPI:  Patient is complaining of knee pain in his left knee status post total knee arthroplasty over 1 year ago in [place].  He states that he is currently in a legal navas with that surgeon.  Patient states that he was walking and felt a pop in his knee and went to the ER proximally 2 weeks ago without a traumatic event.  He says he has popping clicking in his in his knee but no other symptoms.  He does have numbness in the lateral aspect of his knee likely from the surgery.  He reports today in normal tennis shoes without assist without a limp.  States he has pain on get up and go and pain going down stairs.  No other numbness or tingling or some    ROS:  Constitutional: Denies fever chills  Eyes: No change in vision  ENT: No ringing or current infections  CV: No chest pain  Resp: No labored breathing  MSK: Pain evident at site of injury located in HPI,   Integ: No signs of abrasions or lacerations  Neuro: No numbness or tingling  Lymphatic: No swelling outside the area of injury     History reviewed. No pertinent past medical history.   Past Surgical History:   Procedure Laterality Date    Left total knee replacement Left 02/08/2022    [facility]    NASAL SINUS SURGERY Bilateral       Current Outpatient Medications on File Prior to Visit   Medication Sig Dispense Refill    amLODIPine (NORVASC) 5 MG tablet Take 5 mg by mouth once daily.      atorvastatin (LIPITOR) 20 MG tablet Take 20 mg by mouth.      azelastine (ASTELIN) 137 mcg (0.1 %) nasal spray 1 spray (137 mcg total) by Nasal route 2 (two) times daily. 30 mL 1    busPIRone (BUSPAR) 15 MG tablet Take 15 mg by mouth 2 (two) times daily.      cetirizine  (ZYRTEC) 10 MG tablet Take 1 tablet (10 mg total) by mouth once daily. 30 tablet 3    cloNIDine (CATAPRES) 0.2 MG tablet Take 0.2 mg by mouth.      clopidogrel bisulfate (PLAVIX ORAL) Plavix Take No date recorded No form recorded No frequency recorded No route recorded No set duration recorded No set duration amount recorded suspended No dosage strength recorded No dosage strength units of measure recorded      DULoxetine 40 mg CpDR Take 1 capsule by mouth.      FLONASE ALLERGY RELIEF 50 mcg/actuation nasal spray 1 spray (50 mcg total) by Each Nostril route once daily at 6am. 9.9 mL 4    furosemide (LASIX) 20 MG tablet Take 20 mg by mouth.      nebivoloL (BYSTOLIC) 10 MG Tab Take 10 mg by mouth.      olmesartan (BENICAR) 20 MG tablet TAKE 1/2 (ONE-HALF) TABLET BY MOUTH ONCE DAILY (STOP BENICAR 40MG)      pantoprazole (PROTONIX) 40 MG tablet Take 1 tablet (40 mg total) by mouth once daily. 60 tablet 2     No current facility-administered medications on file prior to visit.      History reviewed. No pertinent family history.   Social History     Tobacco Use    Smoking status: Never    Smokeless tobacco: Never   Substance Use Topics    Drug use: Not Currently      No LMP for male patient.          Objective:      Resp 18   Ht 6' (1.829 m)   Wt 62.6 kg (138 lb 0.1 oz)   BMI 18.72 kg/m²   General the patient is alert and oriented x3 no acute distress nontoxic-appearing appropriate affect.    Constitutional: Vital signs are examined and stable.  Resp: No signs of labored breathing               LLE: -Skin:  No effusion no ecchymosis, no signs of trauma, No signs of new abrasions or lacerations, no scars           -MSK: Hip and Knee F/E, EHL/FHL, Gastroc/Tib anterior Strength 5/5, 5/5 strength with straight leg raise           -Neuro:  Decreased sensation in the lateral aspect of the knee           -Lymphatic: No signs of lymphadenopathy           -CV: . Compartments soft and compressible    Body mass index is 18.72  kg/m².  Ideal body weight: 77.6 kg (171 lb 1.2 oz)  Lab Results   Component Value Date     06/18/2019    K 4.4 06/18/2019    CO2 31.2 06/18/2019    CALCIUM 9.1 06/18/2019    BUN 14 06/18/2019      Lab Results   Component Value Date    HGB 15.2 06/18/2019    HCT 47.2 06/18/2019       Radiology:  Two views left knee skeletally mature individual show intact hardware no signs of displacement the knee joint appears to be stable.  There is mild notching of the anterior femur.  Patella has a cortical disruption of the inferior pole it is soft appears to be subacute or chronic.  No effusion no other signs of nate        Assessment:         1. Closed nondisplaced fracture of left patella, unspecified fracture morphology, initial encounter  X-Ray Knee 1 or 2 View Left    Ambulatory referral/consult to Orthopedics              Plan:         No follow-ups on file.    Hugh was seen today for injury.    Diagnoses and all orders for this visit:    Closed nondisplaced fracture of left patella, unspecified fracture morphology, initial encounter  -     X-Ray Knee 1 or 2 View Left; Future  -     Ambulatory referral/consult to Orthopedics      Patient was sent in for an inferior pole patella fracture atraumatic.  He has full knee range of motion strength and strength.  He has no signs of fracture.  Most of the symptoms are on the superior pole of the patella and on the lateral aspect of his knee which she is had for many months since his total knee arthroplasty.  He admits that he has been current legal navas with that surgeon.  My training and trauma.  I am seeing him due to an inferior pole patella fracture.  I do not believe this is acute fracture there no other signs besides x-rays that show fracture.  It is likely a remnant from the previous surgery.  Patient has no deficits at this time.  I recommend he follow up with the arthroplasty surgeon.  All questions were answered.  Overall he appears to be well ambulating without  deficit wound no assist today on his ambulation in her out of the office.  No swelling on exam.        This note/OR report was created with the assistance of  voice recognition software or phone  dictation.  There may be transcription errors as a result of using this technology however minimal. Effort has been made to assure accuracy of transcription but any obvious errors or omissions should be clarified with the author of the document.     No future appointments.        Adelfo Hicks DO  Orthopedic Trauma Surgery  08/15/2023

## 2024-04-23 DIAGNOSIS — S82.092A: Primary | ICD-10-CM

## 2024-05-22 NOTE — PROGRESS NOTES
[facility]  New Patient Office Visit  05/24/2024       Patient ID: Hugh Jones Jr.  YOB: 1960  MRN: [id number]    Chief Complaint: Pain of the Left Knee (Knee replacement 2021 has had pain x 8 months)      Orthopaedic Injuries:  Closed nondisplaced fracture inferior pole of patella 8/2023    Orthopaedic Surgeries:   Hx L TKA     HPI:  Hugh Jones Jr. is a 63 y.o. male with above. He was seen by one of the trauma partners almost 1 year ago for this problem. At the time the case was under litigation with his arthroplasty surgeon. He reports cardiac arrest at the time of his surgery that he feels like is where the problem started.     12 point ROS performed and negative except as above.     Past Medical History:    Past Medical History:   Diagnosis Date    Hypertension      Past Surgical History:   Procedure Laterality Date    Left total knee replacement Left 02/08/2022    [facility] [place]    NASAL SINUS SURGERY Bilateral      Family History   Problem Relation Name Age of Onset    Hypertension Mother       Social History     Socioeconomic History    Marital status:    Tobacco Use    Smoking status: Never    Smokeless tobacco: Never   Substance and Sexual Activity    Drug use: Not Currently    Sexual activity: Not Currently     Medication List with Changes/Refills   Current Medications    AMLODIPINE (NORVASC) 5 MG TABLET    Take 5 mg by mouth once daily.    ATORVASTATIN (LIPITOR) 20 MG TABLET    Take 20 mg by mouth.    AZELASTINE (ASTELIN) 137 MCG (0.1 %) NASAL SPRAY    1 spray (137 mcg total) by Nasal route 2 (two) times daily.    BUSPIRONE (BUSPAR) 15 MG TABLET    Take 15 mg by mouth 2 (two) times daily.    CETIRIZINE (ZYRTEC) 10 MG TABLET    Take 1 tablet (10 mg total) by mouth once daily.    CLONIDINE (CATAPRES) 0.2 MG TABLET    Take 0.2 mg by mouth.    CLOPIDOGREL BISULFATE (PLAVIX ORAL)    Plavix Take No date recorded No form recorded No  frequency recorded No route recorded No set duration recorded No set duration amount recorded suspended No dosage strength recorded No dosage strength units of measure recorded    DULOXETINE 40 MG CPDR    Take 1 capsule by mouth.    FLONASE ALLERGY RELIEF 50 MCG/ACTUATION NASAL SPRAY    1 spray (50 mcg total) by Each Nostril route once daily at 6am.    FUROSEMIDE (LASIX) 20 MG TABLET    Take 20 mg by mouth.    NEBIVOLOL (BYSTOLIC) 10 MG TAB    Take 10 mg by mouth.    OLMESARTAN (BENICAR) 20 MG TABLET    TAKE 1/2 (ONE-HALF) TABLET BY MOUTH ONCE DAILY (STOP BENICAR 40MG)    PANTOPRAZOLE (PROTONIX) 40 MG TABLET    Take 1 tablet (40 mg total) by mouth once daily.     Review of patient's allergies indicates:  No Known Allergies    Physical Exam:  Ambulates without assistive devices  Left knee:  Surgical incision is well healed  Range of motion 0-110  Extensor mechanism is intact    Imaging independently interpreted:  X-ray left knee:  Again visualized on ossific density of the inferior pole of the patella.  This appears unchanged from radiographs dated 08/15/2023.     Assessment and Plan:    Hugh Verdinvic Caro is a 63 y.o. male with above.     -I offered him a course of therapy for gait training and quad strengthening  -He understands that he should follow up with his initial arthroplasty surgeon, he reports this is not a possibility.  I discussed with him the possibility of a referral to another arthroplasty surgeon for a 2nd opinion, however he has not interested in this at this point.    WB Status:  Weightbearing as tolerated  Follow up:  As needed  XR/to-do next visit:  None    Genny Payne MD  [employer] [facility] PGY3

## 2024-05-24 ENCOUNTER — HOSPITAL ENCOUNTER (OUTPATIENT)
Dept: RADIOLOGY | Facility: HOSPITAL | Age: 64
Discharge: HOME | End: 2024-05-24
Attending: STUDENT IN AN ORGANIZED HEALTH CARE EDUCATION/TRAINING PROGRAM
Payer: MEDICAID

## 2024-05-24 ENCOUNTER — OFFICE VISIT (OUTPATIENT)
Dept: ORTHOPEDICS | Facility: CLINIC | Age: 64
End: 2024-05-24
Payer: MEDICAID

## 2024-05-24 VITALS
DIASTOLIC BLOOD PRESSURE: 96 MMHG | HEIGHT: 72 IN | BODY MASS INDEX: 32.64 KG/M2 | OXYGEN SATURATION: 100 % | SYSTOLIC BLOOD PRESSURE: 164 MMHG | TEMPERATURE: 99 F | HEART RATE: 71 BPM | WEIGHT: 241 LBS | RESPIRATION RATE: 20 BRPM

## 2024-05-24 DIAGNOSIS — M25.562 CHRONIC PAIN OF LEFT KNEE: ICD-10-CM

## 2024-05-24 DIAGNOSIS — G89.29 CHRONIC PAIN OF LEFT KNEE: ICD-10-CM

## 2024-05-24 DIAGNOSIS — S82.092A: Primary | ICD-10-CM

## 2024-05-24 PROCEDURE — 73564 X-RAY EXAM KNEE 4 OR MORE: CPT | Mod: TC,LT

## 2024-05-24 PROCEDURE — 3077F SYST BP >= 140 MM HG: CPT | Mod: CPTII,,, | Performed by: STUDENT IN AN ORGANIZED HEALTH CARE EDUCATION/TRAINING PROGRAM

## 2024-05-24 PROCEDURE — 3288F FALL RISK ASSESSMENT DOCD: CPT | Mod: CPTII,,, | Performed by: STUDENT IN AN ORGANIZED HEALTH CARE EDUCATION/TRAINING PROGRAM

## 2024-05-24 PROCEDURE — 1125F AMNT PAIN NOTED PAIN PRSNT: CPT | Mod: CPTII,,, | Performed by: STUDENT IN AN ORGANIZED HEALTH CARE EDUCATION/TRAINING PROGRAM

## 2024-05-24 PROCEDURE — 3008F BODY MASS INDEX DOCD: CPT | Mod: CPTII,,, | Performed by: STUDENT IN AN ORGANIZED HEALTH CARE EDUCATION/TRAINING PROGRAM

## 2024-05-24 PROCEDURE — 3080F DIAST BP >= 90 MM HG: CPT | Mod: CPTII,,, | Performed by: STUDENT IN AN ORGANIZED HEALTH CARE EDUCATION/TRAINING PROGRAM

## 2024-05-24 PROCEDURE — 99214 OFFICE O/P EST MOD 30 MIN: CPT | Mod: PBBFAC,25

## 2024-05-24 PROCEDURE — 99203 OFFICE O/P NEW LOW 30 MIN: CPT | Mod: S$PBB,,, | Performed by: STUDENT IN AN ORGANIZED HEALTH CARE EDUCATION/TRAINING PROGRAM

## 2024-05-24 PROCEDURE — 4010F ACE/ARB THERAPY RXD/TAKEN: CPT | Mod: CPTII,,, | Performed by: STUDENT IN AN ORGANIZED HEALTH CARE EDUCATION/TRAINING PROGRAM

## 2024-05-24 PROCEDURE — 1159F MED LIST DOCD IN RCRD: CPT | Mod: CPTII,,, | Performed by: STUDENT IN AN ORGANIZED HEALTH CARE EDUCATION/TRAINING PROGRAM

## 2024-05-24 PROCEDURE — 1101F PT FALLS ASSESS-DOCD LE1/YR: CPT | Mod: CPTII,,, | Performed by: STUDENT IN AN ORGANIZED HEALTH CARE EDUCATION/TRAINING PROGRAM

## 2024-05-24 NOTE — PROGRESS NOTES
Faculty Attestation: Hugh Jones Jr.  was seen at [facility] in the Orthopaedic Clinic. Patient seen and evaluated at the time of the visit. History of Present Illness, Physical Exam, and Assessment and Plan reviewed. Treatment plan is reasonable and appropriate. Compliance with treatment recommendations is important. No procedure was performed.     Jerardo Gooden MD  Orthopaedic Surgery